# Patient Record
(demographics unavailable — no encounter records)

---

## 2018-05-04 NOTE — XMS REPORT
Patient Summary Document

 Created on: 2018



CHARLOTTE ROBLES

External Reference #: [id number]

: 1949

Sex: Male



Demographics







 Address  [address] [place] [address]

 

 Home Phone  [phone]

 

 Preferred Language  Unknown

 

 Marital Status  Unknown

 

 Catholic Affiliation  Unknown

 

 Race  Unknown

 

 Additional Race(s)  

 

 Ethnic Group  Unknown





Author







 Author  [facility]

 

 Address  Unknown

 

 Phone  Unavailable







Care Team Providers







 Care Team Member Name  Role  Phone

 

 ERNESTINE LINCOLN  Unavailable  Unavailable







Problems

This patient has no known problems.



Allergies, Adverse Reactions, Alerts

This patient has no known allergies or adverse reactions.



Medications

This patient has no known medications.



Results







 Test Description  Test Time  Test Comments  Text Results  Atomic Results  
Result Comments









 CT ABDOMEN/PELVIS W            [place] [address]      Patient Name: CHARLOTTE ROBLES 
  MR #: [id number]    : 1949 Age/Sex: 67/M  Acct #: [id number] Req #
: [id number]  Adm Physician:     Ordered by: ERNESTINE LINCOLN MD  Report #: [id number]
[id number]   Location: ER  Room/Bed:     _____________________________________________
______________________________________________________    Procedure: [id number] 
CT/CT ABDOMEN/PELVIS W  Exam Date: 17                            Exam Time
:        REPORT STATUS: Signed    EXAM: CT Abdomen and Pelvis WITH contrast
     INDICATION: Abdominal pain. Nausea.   COMPARISON: None.   TECHNIQUE: 
Abdomen and pelvis were scanned utilizing a multidetector helical   scanner 
from the lung base to the pubic symphysis after administration of IV   
contrast. Coronal and sagittal reformations were obtained. Routine protocol was
   performed. Scan was performed when during portal venous phase.               
IV CONTRAST: 100 mL of Isovue-370               ORAL CONTRAST: Water           
    RADIATION DOSE: Total DLP: 872.94 mGy*cm                Estimated effective 
dose: (DLP x 0.015 x size factor) mSv               COMPLICATIONS: None      
FINDINGS:      LINES and TUBES: None.      LOWER THORAX:  Unremarkable      
HEPATOBILIARY:      No focal hepatic lesions. No biliary ductal dilation.       
GALLBLADDER: No radio-opaque stones or sludge.  No wall thickening.      SPLEEN
: No splenomegaly.       PANCREAS: No focal masses or ductal dilatation.        
ADRENALS: No adrenal nodules          KIDNEYS/URETERS: Kidneys enhance 
symmetrically.  No hydronephrosis.  Too small   to characterize hypodensity in 
the peripheral right kidney.  No stones.      GI TRACT: No abnormal distention, 
wall thickening, or evidence of bowel   obstruction.       Appendix is normal. 
Scattered diverticulosis without   evidence of diverticulitis.      PELVIC 
ORGANS/BLADDER: Unremarkable.      LYMPH NODES: No lymphadenopathy.      VESSELS
: Scattered vascular calcifications.      PERITONEUM / RETROPERITONEUM: No free 
air or fluid.      BONES: Unremarkable.      SOFT TISSUES: Unremarkable.   
Small fat-containing right inguinal hernia.            IMPRESSION:    1.  
Scattered diverticulosis without evidence of diverticulitis.      2.  Small fat-
containing right inguinal hernia.      Signed by: Dr. Baltazar Mccarty M.D. on 2017 7:53 PM        Dictated By: BALTAZAR MCCARTY MD, MD  Electronically Signed By: 
BALTAZAR MCCARTY MD, MD on 17  Transcribed By: LUZMA on 17 
      COPY TO:   ERNESTINE LINCOLN MD

## 2018-05-04 NOTE — DIAGNOSTIC IMAGING REPORT
EXAM: XR CHEST 2 VIEWS



DATE: 5/4/2018 6:37 PM



INDICATION: Congestion/pain



COMPARISON: None



FINDINGS:



Lines and Tubes: None



Heart and Mediastinum: No acute findings.



Lungs and Pleura: Minimal opacities in the lung bases statistically represent

atelectasis, however, infectious process could have a similar appearance.



Bones and Soft Tissues: No acute findings.



IMPRESSION: 

1.  Patchy basilar opacities suggest atelectasis. Infectious process not

excluded.



Signed by: Dr. Dimitry Hyatt MD on 5/4/2018 8:12 PM

## 2018-05-11 NOTE — DIAGNOSTIC IMAGING REPORT
PROCEDURE:

A single AP view of the chest.

 

COMPARISON: [facility], , CHEST 2 VIEWS, 5/04/2018, 

18:43.

 

INDICATIONS:   SHORTNESS OF BREATH, PNEUMONIA

     

FINDINGS: Exam limited by soft tissue attenuation.

Lines/tubes:  None.

 

Lungs:  The lungs are well inflated and clear. There is no evidence of 

pneumonia or pulmonary edema.

 

Pleura:  There is no pleural effusion or pneumothorax.

 

Heart and mediastinum:  The heart and the mediastinum are unremarkable.

 

Bones:  No acute bony abnormality.

 

IMPRESSION: 

 

1.  No acute cardiopulmonary abnormalities 

 

 

Jesus Alberto Babcock M.D.  

Dictated by:  Jesus Alberto Babcock M.D. on 5/11/2018 at 13:36     

Electronically approved by:  Jesus Alberto Babcock M.D. on 5/11/2018 at 

13:36

## 2018-05-11 NOTE — XMS REPORT
Continuity of Care Document

 Created on: 2018



CHARLOTTE ROBLES

External Reference #: [id number]

: 1949

Sex: Male



Demographics







 Address  [address] [place]  [address]

 

 Home Phone  [phone]

 

 Preferred Language  Unknown

 

 Marital Status  Unknown

 

 [employer] Affiliation  Unknown

 

 Race  Other

 

 Ethnic Group  Unknown





Author







 Author  [facility]

 

 Organization  [facility]

 

 Address  [address] [place]  [address]



 

 Phone  Unavailable







Support







 Name  Relationship  Address  Phone

 

 SUSY EDWARDS MD  Caregiver  [address] [place]  [address]  [phone]

 

 SUSY EDWARDS MD  Caregiver  [address] [place]  [address]  [phone]

 

 KELVIN VILLEDA MD  Caregiver  [address]

[place]  [address]  Unavailable

 

 CHARLOTTE ROBLES  Next Of Kin  [address], [place]  [phone]







Care Team Providers







 Care Team Member Name  Role  Phone

 

 SUSY EDWARDS MD  PCP  [phone]







Insurance Providers







 Guarantor  Charlotte Robles

 

 Address  [address] [place] [address]

 

 Phone  [phone]











 Payer  [employer]

 

 Policy Number  [id number]

 

 Subscriber's Name  Charlotte Robles

 

 Relationship  18 Self / Same As Patient

 

 Group Number  [id number]

 

 Group Name  [id number]

 

 Effective Date  17











 Payer  [facility]

 

 Policy Number  [id number]

 

 Subscriber's Name  Charlotte Robles

 

 Relationship  18 Self / Same As Patient







Advance Directives







 Directive  Response  Recorded Date/Time

 

 Does the patient have an advance directive?  No  17 5:09pm

 

 If yes, is advance directive on file with [facility]?  No  17 5:09pm

 

 If not on file with [facility] will patient provide a copy?  No  17 5:09pm

 

 Do you have a Directive to Physician?  No  18 7:10pm

 

 Do you have a Medical Power of ?  No  18 7:10pm

 

 Do you have an out of hospital Do Not Resuscitate Order?  No  05/04/18 7:10pm

 

 Do you have any special needs we should be aware of?  No  18 7:10pm

 

 Do you have a support person here with you today?  Yes  18 7:10pm

 

 Did patient receive Notice of Privacy Practices?  Yes  18 7:10pm

 

 Did patient receive patient rights and responsibilities?  Yes  18 7:10pm







Problems

No problem information available.



Medications





Current Home Medications





 Medication  Dose  Units  Route  Directions  Days  Qty  Instructions  Start Date

 

 Acetaminophen (Acetaminophen Extra Strength) 500 Mg Tablet  2  Tab  Oral  
Three Times A Day as needed for Pain        THERAPEUTICALLY SUBSTITUTED WITH 
ACETAMINOPHEN 325MG   

 

 Amlodipine Besylate 5 Mg Tablet  5  Mg  Oral  Daily     30 Tab      

 

 Aspirin (Aspir 81) 81 Mg Tablet.dr  1  Tab  Oral  Daily            

 

 Benzonatate 100 Mg Capsule  100  Mg  Oral  Three Times A Day as needed for 
Cough            

 

 Budesonide/Formoterol Fumarate (Symbicort 160-4.5 Mcg Inhaler) 10.2 Gm 
Hfa.aer.ad  2  Inh  Inhalation  Twice A Day            

 

 Cetirizine Hcl 10 Mg Tablet  1  Tab  Oral  Daily            

 

 Fenofibrate,Micronized (Fenofibrate) 134 Mg Capsule  1  Cap  Oral  Bedtime    
        

 

 Levofloxacin (Levaquin) 500 Mg Tablet  500  Mg  Oral  Daily            

 

 Levothyroxine Sodium 112 Mcg Tablet  125  Mcg  Oral  Daily     30 Tab      

 

 Lisinopril 10 Mg Tablet  20  Mg  Oral  Twice A Day     30 Tab      

 

 Omeprazole 40 Mg Capsule.  20  Mg  Oral  Daily            

 

 Ondansetron (Ondansetron Odt) 8 Mg Tab.rapdis  4  Mg  Sublingual  Every 4 
Hours as needed for Nausea            

 

 Pantoprazole Sodium (Protonix) 40 Mg Tablet.  40  Mg  Oral  Daily            

 

 Sucralfate 1 Gm Tablet  1  Gm  Oral  Twice A Day            







Social History







 Smoking Status  Start Date  Stop Date

 

 Never Smoker      







Hospital Discharge Instructions

No hospital discharge instruction information available.



Plan of Care







 Discharge Date  18 12:27am

 

 Disposition  HOME, SELF-CARE

 

 Condition at Discharge  Stable

 

 Instructions/Education Provided  Bronchitis (Acute) - Adult

 

 Forms Provided  Work/School Excuse

 

 Prescriptions  See Medication Section

 

 Referrals  SUSY EDWARDS MD

Order Date: Call for an appointment

Address:

 [facility] [place] [address] [phone]

Note: 

FOLLOW-UP WITH YOUR DOCTOR ON MONDAY



TAKE RX AS PRESCRIBED.



 

 Additional Instructions/Education  FOLLOW-UP WITH YOUR DOCTOR ON MONDAY-CALL 
FOR APPOINTMENT







Functional Status

No functional status information available.



Allergies, Adverse Reactions, Alerts







 Allergen  Type  Severity  Reaction  Status  Last Updated

 

 Penicillin  Allergy  Intermediate  CONVULSIONS  Active  18







Immunizations

No immunization information available.



Vital Signs





Acute Vital Signs





 Vital  Response  Date/Time

 

 Temperature (Fahrenheit)  98.8 degrees F (97.6 - 99.5)  2018 12:13am

 

 Pulse      

 

    Pulse Rate (adult)  77 bpm (60 - 90)  2018 12:13am

 

 Respiratory Rate  19 bpm (12 - 24)  2018 12:13am

 

 Blood Pressure  126/69 mm Hg  2018 12:13am

 

 Height  5 ft 3 in  2018 6:31pm

 

 Weight  284 lb  2018 6:31pm

 

 Body Mass Index  50.3 kg/m^2  2018 6:31pm







Results





Laboratory Results





 Test Name  Result  Units  Flags  Reference  Collection Date/Time  Result Date/
Time  Comments

 

 Amylase Level  58  U/L       2017 5:30pm  2017 6:03pm   

 

 Lipase  26  U/L     8-78  2017 5:30pm  2017 6:03pm   

 

 White Blood Count  13.25  x10e3/uL   H  4.8-10.8  2018 6:35pm  2018 7:11pm   

 

 Red Blood Count  3.78  x10e6/uL   L  4.3-5.7  2018 6:35pm  2018 7:
11pm   

 

 Hemoglobin  12.5  g/dL   L  14.0-18.0  2018 6:35pm  2018 7:11pm   

 

 Hematocrit  36.1  %   L  38.2-49.6  2018 6:35pm  2018 7:11pm   

 

 Mean Corpuscular Volume  95.5  fL     81-99  2018 6:35pm  2018 7:
11pm   

 

 Mean Corpuscular Hemoglobin  33.1  pg   H  28-32  2018 6:35pm  2018 7:11pm   

 

 Mean Corpuscular Hemoglobin Concent  34.6  g/dL     31-35  2018 6:35pm  
2018 7:11pm   

 

 Red Cell Distribution Width  13.4  %     11.7-14.4  2018 6:35pm  2018 7:11pm   

 

 Platelet Count  243  x10e3/uL     140-360  2018 6:35pm  2018 7:
11pm   

 

 Neutrophils (%) (Auto)  61.5  %     38.7-80.0  2018 6:35pm  2018 7:
11pm   

 

 Lymphocytes (%) (Auto)  25.5  %     18.0-39.1  2018 6:35pm  2018 7:
11pm   

 

 Monocytes (%) (Auto)  11.2   %     4.4-11.3  2018 6:35pm  2018 7:
11pm   

 

 Eosinophils (%) (Auto)  0.8  %     0.0-6.0  2018 6:35pm  2018 7:
11pm   

 

 Basophils (%) (Auto)  0.5  %     0.0-1.0  2018 6:35pm  2018 7:11pm
   

 

 IM GRANULOCYTES %  0.5  %     0.0-1.0  2018 6:35pm  2018 7:11pm   

 

 Neutrophils # (Auto)  8.1      H  2.1-6.9  2018 6:35pm  2018 7:
11pm   

 

 Lymphocytes # (Auto)  3.4      H  1.0-3.2  2018 6:35pm  2018 7:
11pm   

 

 Monocytes # (Auto)  1.5      H  0.2-0.8  2018 6:35pm  2018 7:11pm 
  

 

 Eosinophils # (Auto)  0.1        0.0-0.4  2018 6:35pm  2018 7:11pm
   

 

 Basophils # (Auto)  0.1        0.0-0.1  2018 6:35pm  2018 7:11pm   

 

 Absolute Immature Granulocyte (auto  0.07  x10e3/uL     0-0.1  2018 6:
35pm  2018 7:11pm   

 

 Prothrombin Time  12.0  seconds     11.9-14.5  2018 6:35pm  2018 7:
22pm   

 

 Prothromb Time International Ratio  0.96           2018 6:35pm  2018 7:22pm  Oral Anticoagulant Therapy INR Values:



 1. Low Intensity Therapy        1.5 - 2.0



 2. Moderate Intensity Therapy   2.0 - 3.0



 3. High Intensity Therapy(1)    2.5 - 3.5



 4. High Intensity Therapy(2)    3.0 - 4.0



 5. Panic Value INR              > 5.0

 

 Activated Partial Thromboplast Time  27.1  seconds     23.8-35.5  2018 6:
35pm  2018 7:22pm   

 

 Urine Color  YELLOW        YELLOW  2018 6:30pm  2018 7:12pm   

 

 Urine Clarity  CLEAR        CLEAR  2018 6:30pm  2018 7:12pm   

 

 Urine Specific Gravity  1.015        1.010-1.025  2018 6:30pm  2018 7:12pm   

 

 Urine pH  7        5 - 7  2018 6:30pm  2018 7:12pm   

 

 Urine Leukocyte Esterase  NEGATIVE        NEGATIVE  2018 6:30pm  2018 7:12pm   

 

 Urine Nitrite  NEGATIVE        NEGATIVE  2018 6:30pm  2018 7:12pm 
  

 

 Urine Protein  NEGATIVE        NEGATIVE  2018 6:30pm  2018 7:12pm 
  

 

 Urine Glucose (UA)  NEGATIVE        NEGATIVE  2018 6:30pm  2018 7:
12pm   

 

 Urine Ketones  NEGATIVE        NEGATIVE  2018 6:30pm  2018 7:12pm 
  

 

 Urine Urobilinogen  0.2  mg/dL     0.2 - 1  2018 6:30pm  2018 7:
12pm   

 

 Urine Bilirubin  NEGATIVE        NEGATIVE  2018 6:30pm  2018 7:
12pm   

 

 Urine Blood  NEGATIVE        NEGATIVE  2018 6:30pm  2018 7:12pm   

 

 Urine WBC  NONE  /HPF     0-5  2018 6:30pm  2018 7:24pm   

 

 Urine RBC  NONE  /HPF     0-5  2018 6:30pm  2018 7:24pm   

 

 Urine Bacteria  NONE  /HPF     NONE  2018 6:30pm  2018 7:24pm   

 

 Urine Epithelial Cells  NONE  /LPF     NONE  2018 6:30pm  2018 7:
24pm   

 

 Sodium Level  138  mmol/L     136-145  2018 6:35pm  2018 7:30pm   

 

 Potassium Level  4.7  mmol/L     3.5-5.1  2018 6:35pm  2018 7:30pm
   

 

 Chloride Level  103  mmol/L       2018 6:35pm  2018 7:30pm   

 

 Carbon Dioxide Level  24  mmol/L     22-29  2018 6:35pm  2018 7:
30pm   

 

 Anion Gap  15.7  mmol/L     8-16  2018 6:35pm  2018 7:30pm   

 

 Blood Urea Nitrogen  15  mg/dL     7-2018 6:35pm  2018 7:30pm 
  

 

 Creatinine  1.30  mg/dL   H  0.72-1.25  2018 6:35pm  2018 7:30pm   

 

 BUN/Creatinine Ratio  12        62018 6:35pm  2018 7:30pm   

 

 Estimat Glomerular Filtration Rate  55  ML/MIN   L  60-  2018 6:35pm  2018 7:30pm  Ranges were taken from the National Kidney Disease Education 

Program and the National Kid[employer] Foundation literature.







Reference ranges:



 60 or greater: Normal



 16-59 (for 3 consecutive months): Chronic kidney disease 



 15 or less: Kidney failure

 

 Glucose Level  101  mg/dL       2018 6:35pm  2018 7:30pm   

 

 Calcium Level  9.4  mg/dL     8.4-10.2  2018 6:35pm  2018 7:30pm   

 

 Total Bilirubin  1.1  mg/dL     0.2-1.2  2018 6:35pm  2018 7:30pm 
  

 

 Aspartate Amino Transf (AST/SGOT)  39  IU/L   H  5-34  2018 6:35pm   7:30pm   

 

 Alanine Aminotransferase (ALT/SGPT)  37  IU/L     0-55  2018 6:35pm  2018 7:30pm   

 

 Total Protein  8.0  g/dL     6.5-8.1  2018 6:35pm  2018 7:30pm   

 

 Albumin  4.1  g/dL     3.5-5.0  2018 6:35pm  2018 7:30pm   

 

 Globulin  3.9  g/dL   H  2.3-3.5  2018 6:35pm  2018 7:30pm   

 

 Albumin/Globulin Ratio  1.1        0.8-2.0  2018 6:35pm  2018 7:
30pm   

 

 Alkaline Phosphatase  43  IU/L       2018 6:35pm  2018 7:
30pm   

 

 B-Type Natriuretic Peptide  64.0  pg/mL     0-100  2018 6:35pm  2018 11:21pm   

 

 Creatine Kinase  278  IU/L   H    2018 6:35pm  2018 7:30pm   

 

 Creatine Kinase MB  3.60  ng/mL     0-5.0  2018 6:35pm  2018 7:
39pm   

 

 Troponin I  < 0.001  ng/mL     0-0.300  2018 6:35pm  2018 7:39pm   







Procedures







 Procedure  Status  Date  Provider(s)

 

 Computed tomography of abdomen and pelvis with contrast  Active  17  
ERNESTINE LINCOLN MD

 

 X-ray of chest, two views  Active  18  PEARL LÓPEZ MD







Encounters







 Encounter  Location  Arrival/Admit Date  Discharge/Depart Date  Attending 
Provider

 

 Departed Emergency Room  [facility]  18 5:59pm   12:27am  KELVIN VILLEDA MD

 

 Departed Emergency Room  [facility]  17 3:58pm   10:07pm  KELVIN VILLEDA MD

## 2018-05-11 NOTE — DIAGNOSTIC IMAGING REPORT
PROCEDURE:

CT scan of the chest  WITH intravenous contrast, using PE protocol.

 

TECHNIQUE:

The chest was scanned utilizing a multidetector helical scanner from 

the lung apex through the level of the adrenal glands after the IV 

administration of 100 cc of Isovue 370 with special concentration of 

the pulmonary arteries.  Coronal and sagittal multiplanar reformations 

were obtained.

 

COMPARISON: [facility], CT, CT ABDOMEN/PELVIS W, 

9/28/2017, 19:24.

 

INDICATIONS:  SHORTNESS OF BREATH, PE

    

FINDINGS: Exam limited by patient's large body habitus and suboptimal 

inspiration.

 

Lines/tubes:  None.

 

Lungs and Airways: No filling defects in the main, right or left 

pulmonary arteries to their first order branches. The mid and distal 

portion of segmental branches are not well-opacified and cannot be 

fully evaluated.

Mildly increased attenuation of the pulmonary parenchyma, likely due to 

decreased inspiratory effort.

No consolidation, pulmonary nodules or masses.

Mild bibasilar atelectatic changes.

Atelectatic changes in the lingula secondary to prominent epicardial 

fat pad, stable since prior CT abdomen.

Airways are clear, without endobronchial lesions.

 

Pleura: No effusion, or pneumothorax.

 

Heart and mediastinum: Thyroid is unremarkable. Heart size is normal. 

No pericardial effusion. Prominent fat in the mediastinum, suggesting 

lipomatosis. Aorta is non-aneurysmal. The main pulmonary artery is 

normal in caliber.

 

Lymph nodes: No mediastinal, hilar, or axillary adenopathy.

Abdomen: Limited contrast-enhanced views of the upper abdomen show no 

abnormality within the visualized spleen, pancreas, or left kidney. 

Diffuse hepatic steatosis. The adrenal glands are normal.

 

Bones: No aggressive lytic lesion. Multilevel degenerative disc changes 

in the thoracic spine. Soft tissues are grossly unremarkable

 

IMPRESSION: 

 

1. exam limited by patient's large body habitus and suboptimal 

inspiration. No filling defects in the main, right or left pulmonary 

arteries to their first order branches. The mid and distal portions of 

segmental branches are not well-opacified and cannot be fully 

evaluated.

2. No consolidation, nodules, or masses. Mild bibasilar atelectatic 

changes. Stable atelectatic changes in the lingula secondary to 

prominent epicardial fat-pad.

3. Diffuse hepatic steatosis. Mediastinal lipomatosis.

 

 

 

 

Jesus Alberto Babcock M.D.  

Dictated by:  Jesus Alberto Babcock M.D. on 5/11/2018 at 15:36     

Electronically approved by:  Jesus Alberto Babcock M.D. on 5/11/2018 at 

15:36

## 2018-07-29 NOTE — HISTORY AND PHYSICAL
CHIEF COMPLAINT:  Rectal bleeding.



HPI:  This is a 68-year-old  male, morbidly obese, with 

hypothyroidism and hypertension and history of internal hemorrhoids, who 

comes into the ED with complaints of 2-month history of rectal bleeding.  

Patient sees a outpatient GI specialist,  ______, for his chronic GI 

issues, of note his hemorrhoids.  Patient reports that today he has having 

increased rectal bleeding, came to the ED for further evaluation.  He 

reports having some gross blood in his toilet.  He also reports that his 

stool could be dark tarry in nature.  He denies any foul smelling stool.  

Denies any hematemesis or any nausea or vomiting.  Patient seen and 

evaluated at bedside on the medical floor.



REVIEW OF SYSTEMS

PERTINENT POSITIVE:  Rectal bleeding, questionable black tarry stool.

PERTINENT NEGATIVE:  Denies any chest pain, palpitation, nausea, vomiting, 

diarrhea, dysuria, hematuria, frequency, urgency, lightheadedness, 

dizziness, abdominal pain, headache, shortness of breath, cough, 

congestion, or any other complaints.



The rest of 14-point review of systems have been reviewed with the patient 

and are negative.



ALLERGIES:  TO PENICILLINS.



HOME MEDICATIONS

1. Norvasc 5 mg a day.

2. Aspirin 81 mg daily.

3. Tessalon Perles 100 mg p.o. t.i.d. p.r.n. for cough.

4. Symbicort 160/4.5 mcg inhaled b.i.d.

5. Levothyroxine 125 mcg daily.

6. Lisinopril 20 mg b.i.d.

7. Protonix 40 mg daily.

8. Sucralfate 1 gram p.o. b.i.d.



PAST MEDICAL HISTORY:  Hypertension, morbidly obese, hypothyroidism, also 

has history of COPD.



SURGICAL HISTORY:  Reports none.



FAMILY HISTORY:  Hypertension and diabetes.



SOCIAL HISTORY:  No drugs.  No alcohol.  Does not smoke.  Good social 

support.



VITAL SIGNS:  Temperature is afebrile, pulse is 70, respiratory rate is 19, 

blood pressure 129/67, pulse ox 97% on room air.



LABORATORY DATA:  Lab findings show white count of 8.2, hemoglobin 12, 

hematocrit 35, platelets of 195.  Sodium 135, potassium 4, chloride 100, 

bicarb 26, anion gap of 13.  UA is negative.



MICROBIOLOGY:  None.



IMAGING STUDIES:  None.



PHYSICAL EXAMINATION

GENERAL:  Not in acute distress, alert and oriented x3, morbidly obese.

HEENT:  Head normocephalic, atraumatic.  Eyes, pupils equal and reactive to 

light bilaterally.  Extraocular movements intact bilaterally.  Throat; no 

evidence of any erythema or exudates in the posterior pharynx.  Has poor 

dentition.

NECK:  Supple.  Good range of motion throughout.

PULMONARY:  Clear to auscultation bilaterally.  No wheezing.  No rales.  No 

rhonchi.  No crackles appreciated. 

CARDIOVASCULAR:  Positive S1 and S2.  No murmurs, rubs or gallops 

appreciated.

ABDOMEN:  Soft, nondistended, nontender to palpation.  Bowel sounds 

present.

MUSCULOSKELETAL:  Strength is 5/5 throughout.  No evidence of any 

musculoskeletal deficit on examination.  No weakness appreciated.

NEUROLOGIC:  Cranial nerves II through XII grossly intact.  No evidence of 

any neurologic deficits on exam. 

SKIN:  Intact, warm to touch.  Good cap refill.

PSYCHIATRIC:  Normal affect and mood.

EXTREMITIES:  No edema.  Good range of motion throughout.



IMPRESSION

1. Rectal bleeding likely secondary to hemorrhoids and questionable 

black tarry stool - gastroenterology has been consulted.  Protonix 40 mg 

IV b.i.d. and oral Carafate.

2. Hypertension - stable, continue with same home medications, p.r.n. 

hydralazine.

3. Hypothyroidism - continue with same levothyroxine dose at home.

4. Prophylaxis - hold anticoagulation for now, put on sequential 

compression devices.

5. Fluid, electrolytes, nutrients - low dose IV fluids, heart-healthy 

diet.

6. Disposition - observation, gastroenterology consulted.



RECOMMENDATION/PLAN:  It seems that patient likely has external hemorrhoids 

in nature, but once was cleared by GI, patient will be likely discharged 

home.









DD:  07/29/2018 17:25

DT:  07/29/2018 18:55

Job#:  [id number] VAS

## 2018-07-30 NOTE — CONSULTATION
DATE OF CONSULTATION:  July 30, 2018 



GASTROENTEROLOGY CONSULTATION



REFERRING PHYSICIAN:  Dr. Reece.



REASON FOR CONSULTATION:  Rectal bleeding.



HISTORY OF PRESENT ILLNESS:  Mr. Manriquez is a very pleasant 68-year-old man 

who I know well from my outpatient practice.  He has history of large 

hemorrhoids.  He also has diverticulosis and history of nonspecific 

colitis.  He has been having, about 4 or 5 months now, of low volume bright 

red blood per rectum.  It drips out and is on the toilet paper but is never 

mixed in with stools.  He was referred last year Dr. Dyer for treatment 

of his hemorrhoids but never had a chance to go and take care of it.  He is 

interested in doing that now.  He has had no melena or abdominal pain.  He 

has no nausea or vomiting.  He has had no fevers, chills or sweats.  



PAST MEDICAL HISTORY:  Diverticulosis, hemorrhoids, hypertension, 

hypothyroidism, morbid obesity.



MEDICATIONS AND ALLERGIES:  Reviewed.  Please see MAR medication 

reconciliation form.



SOCIAL HISTORY:  No alcohol, tobacco or illicit substances.



FAMILY HISTORY:  Reviewed, noncontributory.



REVIEW OF SYSTEMS:  System review is positive for that mentioned in history 

of present illness, otherwise unremarkable.  



PHYSICAL EXAMINATION 

GENERAL:  Pleasant, alert, oriented.  No acute distress. 

HEENT:  Pupils equal, round and react to light.

NECK:  Supple. 

LUNGS:  Clear. 

CARDIOVASCULAR:  S1, S2.

ABDOMEN:  Soft, nontender, nondistended, normal bowel sounds. 

EXTREMITIES:  No clubbing, cyanosis, edema.

PSYCHIATRIC:  Calm, cooperative. 

NEUROLOGIC:  Nonfocal.

HEME/ONC:  No bruising or adenopathy.



Electronic health record reviewed for laboratory and radiologic studies as 

well as history.



ASSESSMENT

1. Rectal bleeding.

2. History of hemorrhoids.



PLAN:  At the current time I do think it is hemorrhoidal in nature.  He 

will likely go home today. He will follow up with Dr. Dyer for treatment 

of his hemorrhoids and follow up thereafter with me.  In the meantime will 

prescribe Anusol suppositories.



Thank you very much for asking me to see Mr. Manriquez.  Any questions or 

concerns please do not hesitate to contact me.  I will follow with him if 

he stays.





DD:  07/30/2018 15:37

DT:  07/30/2018 15:58

Job#:  [id number]

## 2018-07-30 NOTE — DISCHARGE SUMMARY
DISCHARGE DIAGNOSES   

1. Rectal bleeding likely secondary to hemorrhoids with stable 

hemoglobin-cleared by gastrointestinal for discharge home.  

2. Hypertension. 

3. Hypothyroidism.

4. Morbidly obese. 



CONSULTANTS:  GI.



VITAL SIGNS:  Temperature is 98.1, pulse 70, respiratory rate is 19, blood 

pressure 152/60, pulse ox 94% on room air.



LAB FINDINGS:  Show white count of 5.5, hemoglobin 10.4, hematocrit 31, 

platelets of 159,000.  Coagulation PT 13.6, INR 1.1, PTT 30.9.  Chemistry, 

sodium 140, potassium 4.2, chloride 106, bicarb 26, anion gap of 12.  BUN 

is 14, creatinine is 1, glucose is 96, calcium 8.5.  LFTs were normal.  

Total protein 7 albumin 4.2.  Urinalysis was found to be negative.



MICROBIOLOGY:  None.



IMAGING STUDIES:  None.



HOSPITAL COURSE:  A 68-year-old  male, morbidly obese with 

underlying hypertension, who comes into the ED with concerns of underlying 

black, tarry stool and bright red blood per rectum.  Patient came in with a 

hemoglobin of 12.  Patient was admitted under observation and GI was 

consulted.  Patient has a known history of rectal hemorrhoids in the past 

and GI cleared the patient for discharge.  She will discharge home Anusol 

suppositories and is to follow up with Dr. Dyer for his hemorrhoid 

treatment.  On discharge, patient's hemoglobin maintained stable.  On the 

day of discharge, vital signs stable.  Labs are stable.  Patient seen, 

evaluated, and examined thoroughly on the day of discharge, no other 

complaints.  Patient verbalized understanding and agrees to plan of care.  

To follow up accordingly as an outpatient with his primary care physician 

in 1 week and GI and general surgery in the next 1-2 weeks.  

.

MEDICATIONS:  See med reconciliation form.  



DISPOSITION:  To home.



CONDITION:  Stable. 



DIET:  Heart healthy.  



FOLLOWUP

1. With her primary care physician in 1 week.  

2. GI specialist in general surgery in 1 to 2 weeks.  



In the event of any worsening symptoms, patient was advised to come back to 

the ED for further evaluation.  



Discharge summary took greater than 35 minutes.









DD:  07/30/2018 21:08

DT:  07/30/2018 23:20

Job#:  [id number]

## 2018-11-29 NOTE — DIAGNOSTIC IMAGING REPORT
CT BRAIN WO



HISTORY: Fall



COMPARISON:  None.



TECHNIQUE: 

Noncontrast axial scans were obtained from skull base to the vertex.  Coronal

and sagittal reconstructions obtained from the axial data.  One or more of the

following dose reduction techniques were used: Automated exposure control,

adjustment of the mA and/or kV according to patient size, and/or utilization of

iterative reconstruction technique. Beam hardening artifacts obscure some

details.



DISCUSSION:



Scalp/Skull: Unremarkable.

Brain sulci: Appropriate for patient's age.

Ventricles: Normal in size and configuration.  No hydrocephalus.

Extra-axial spaces: No masses or fluid collections.  Mild carotid siphon

calcifications are present.



Parenchyma: 

Mild periventricular white matter hypodensities are likely chronic

microvascular ischemic changes.

Otherwise, no masses, hemorrhage, or large vascular territory acute infarct.



Dural sinuses:  No abnormal densities.

Sellar/Suprasellar region: Intact.

Skull base: Intact.

Incidental findings: None.



IMPRESSION:



1.  No acute intracranial abnormalities.

2.  Mild supratentorial chronic microvascular ischemic change.



Signed by: Dr. Heraclio Boswell M.D. on 11/29/2018 11:11 AM

## 2018-11-29 NOTE — DIAGNOSTIC IMAGING REPORT
CT CERVICAL SPINE WO



HISTORY: Fall



COMPARISON:  Concurrent head CT



TECHNIQUE: CT of the cervical spine without contrast.  Sagittal and coronal

reformations were created.  One or more of the following dose reduction

techniques were used: Automated exposure control, adjustment of the mA and/or

kV according to patient size, and/or utilization of iterative reconstruction

technique.



FINDINGS:  

Streak artifacts obscure some details.

Mild bone demineralization also limits evaluation.

Cervical lordosis is straightened.

There is no scoliosis or subluxation.

No definite acute fracture or compression deformity is seen.  

The craniocervical junction is intact. 

No gross spinal canal masses are seen. 

The paravertebral and paraspinal soft tissues are unremarkable. 



Multilevel spondylotic changes are advanced from C3-C4 to C5-C6. Mild to

moderate atlantoaxial arthrosis is present as well. There is prominent facet

arthrosis on the right at C2-C3.



Mild bilateral carotid bulb calcifications are present.



IMPRESSION:  

1.  No acute osseous abnormalities.

2.  Advanced spondylosis from C3-C4 to C5-C6. Prominent right C2-C3 facet

arthrosis.



Signed by: Dr. Heraclio Boswell M.D. on 11/29/2018 11:17 AM